# Patient Record
Sex: MALE | Race: WHITE | NOT HISPANIC OR LATINO | URBAN - METROPOLITAN AREA
[De-identification: names, ages, dates, MRNs, and addresses within clinical notes are randomized per-mention and may not be internally consistent; named-entity substitution may affect disease eponyms.]

---

## 2023-08-17 ENCOUNTER — APPOINTMENT (OUTPATIENT)
Dept: URBAN - METROPOLITAN AREA CLINIC 231 | Age: 38
Setting detail: DERMATOLOGY
End: 2023-09-01

## 2023-08-17 DIAGNOSIS — L72.0 EPIDERMAL CYST: ICD-10-CM

## 2023-08-17 PROCEDURE — 99203 OFFICE O/P NEW LOW 30 MIN: CPT

## 2023-08-17 PROCEDURE — OTHER TREATMENT REGIMEN: OTHER

## 2023-08-17 PROCEDURE — OTHER COUNSELING: OTHER

## 2023-08-17 PROCEDURE — OTHER DEFER: OTHER

## 2023-08-17 PROCEDURE — OTHER MIPS QUALITY: OTHER

## 2023-08-17 ASSESSMENT — LOCATION DETAILED DESCRIPTION DERM: LOCATION DETAILED: LEFT SUPERIOR LATERAL MIDBACK

## 2023-08-17 ASSESSMENT — LOCATION ZONE DERM: LOCATION ZONE: TRUNK

## 2023-08-17 ASSESSMENT — LOCATION SIMPLE DESCRIPTION DERM: LOCATION SIMPLE: LEFT LOWER BACK

## 2023-08-17 NOTE — PROCEDURE: TREATMENT REGIMEN
DIABETES HOME INSTRUCTIONS    Meal Planning  Eat regularly during the day, every 4-5 hours, Do not skip meals, Include 45-60 grams of carbohydrates at each meal, Limit snacks to 15 grams of carbohydrate and Include consistent portions and meal times.        Physical Activity Increase walking by parking further from store entrance, taking the stairs rather than the elevator and increasing your steps throughout the day.   I will work up to performing 30 minutes of physical activity 5 times per week.    Diabetes Medication:   Metformin 1700 mg daily  Take U-500 insuin 30 minutes before each meal and as follows per chart below (twice daily):  Blood Sugar Wake up (0700) Brunch (2429-7928 am) Dinner (4017-5851 pm)   Less than 100 10 65 30   100-149  15 75 60   150-200 20 85 50   Above 200 25 95 60   Above 250 30 105 70   Above 300 35 115 80      Blood Sugar Monitoring  Check 4 times per day, before each meal and bedtime.  Please bring your blood sugar meter, strips, and blood sugar record to all education and medical appointments.    Your Blood Sugar Target is:   before meals;  Less than 140 two hours after meals;  Less than 180 peak after meals; 110-150 at bedtime.    Call Your Doctor If Blood Sugar Is:  Higher than 300 mg/dL or lower than 70 mg/dL for two tests in a row.    Call Your Doctor For:  [facility] Next blood sugar test strips for 4 tests/day.    Miscellaneous:  Get a home sharps container.    Goal Planning:  The goal you selected is:  I will aim to eat vegetables 4 times per week.               Complete your annual diabetes eye exam.    We Suggest Making An Appointment With Your  Doctor's Office (at least every 3-6 months, Eye Doctor (at least yearly), Dentist (at least every 6 months), Foot Doctor (as needed)    Lab Tests done today:  No labs were ordered by Education     Thank you.  Please call me if any questions or concerns.  Juan Corado RD, CD, [facility] 
Education  
Plan: Scheduled for excision with Dr. Rosario 09/27/23 at 11:30am
Detail Level: Zone

## 2023-09-27 ENCOUNTER — APPOINTMENT (OUTPATIENT)
Dept: URBAN - METROPOLITAN AREA CLINIC 231 | Age: 38
Setting detail: DERMATOLOGY
End: 2023-09-27

## 2023-09-27 DIAGNOSIS — D492 NEOPLASM OF UNSPECIFIED NATURE OF BONE, SOFT TISSUE, AND SKIN: ICD-10-CM

## 2023-09-27 PROBLEM — R22.2 LOCALIZED SWELLING, MASS AND LUMP, TRUNK: Status: ACTIVE | Noted: 2023-09-27

## 2023-09-27 PROCEDURE — OTHER COUNSELING: OTHER

## 2023-09-27 PROCEDURE — 12032 INTMD RPR S/A/T/EXT 2.6-7.5: CPT

## 2023-09-27 PROCEDURE — 11402 EXC TR-EXT B9+MARG 1.1-2 CM: CPT

## 2023-09-27 PROCEDURE — OTHER EXCISION: OTHER

## 2023-09-27 ASSESSMENT — LOCATION DETAILED DESCRIPTION DERM: LOCATION DETAILED: LEFT SUPERIOR LATERAL MIDBACK

## 2023-09-27 ASSESSMENT — LOCATION ZONE DERM: LOCATION ZONE: TRUNK

## 2023-09-27 ASSESSMENT — LOCATION SIMPLE DESCRIPTION DERM: LOCATION SIMPLE: LEFT LOWER BACK

## 2023-09-27 NOTE — PROCEDURE: EXCISION
code, adult/STEMI Where Do You Want The Question To Include Opioid Counseling Located?: Case Summary Tab

## 2023-10-11 ENCOUNTER — APPOINTMENT (OUTPATIENT)
Dept: URBAN - METROPOLITAN AREA CLINIC 231 | Age: 38
Setting detail: DERMATOLOGY
End: 2023-10-11

## 2023-10-11 DIAGNOSIS — Z48.02 ENCOUNTER FOR REMOVAL OF SUTURES: ICD-10-CM

## 2023-10-11 PROCEDURE — 99024 POSTOP FOLLOW-UP VISIT: CPT

## 2023-10-11 PROCEDURE — OTHER SUTURE REMOVAL (GLOBAL PERIOD): OTHER

## 2023-10-11 ASSESSMENT — LOCATION ZONE DERM: LOCATION ZONE: TRUNK

## 2023-10-11 ASSESSMENT — LOCATION DETAILED DESCRIPTION DERM: LOCATION DETAILED: LEFT SUPERIOR LATERAL MIDBACK

## 2023-10-11 ASSESSMENT — LOCATION SIMPLE DESCRIPTION DERM: LOCATION SIMPLE: LEFT LOWER BACK

## 2023-10-11 NOTE — PROCEDURE: SUTURE REMOVAL (GLOBAL PERIOD)
Detail Level: Detailed
Add 61052 Cpt? (Important Note: In 2017 The Use Of 65624 Is Being Tracked By Cms To Determine Future Global Period Reimbursement For Global Periods): yes

## 2024-07-29 NOTE — PROCEDURE: EXCISION
Glucoscan  Metformin  Insulin  No evidence of hypoglycemia     Burow's Graft Text: The defect edges were debeveled with a #15 scalpel blade. Given the location of the defect, shape of the defect, the proximity to free margins and the presence of a standing cone deformity a Burow's skin graft was deemed most appropriate. The standing cone was removed and this tissue was then trimmed to the shape of the primary defect. The adipose tissue was also removed until only dermis and epidermis were left.  The skin margins of the secondary defect were undermined to an appropriate distance in all directions utilizing iris scissors.  The secondary defect was closed with interrupted buried subcutaneous sutures.  The skin edges were then re-apposed with running  sutures.  The skin graft was then placed in the primary defect and oriented appropriately.
